# Patient Record
Sex: MALE | Race: WHITE | NOT HISPANIC OR LATINO | Employment: FULL TIME | URBAN - METROPOLITAN AREA
[De-identification: names, ages, dates, MRNs, and addresses within clinical notes are randomized per-mention and may not be internally consistent; named-entity substitution may affect disease eponyms.]

---

## 2022-04-25 ASSESSMENT — ENCOUNTER SYMPTOMS
COUGH: 0
WEAKNESS: 0
CHILLS: 0
FREQUENCY: 0
DIARRHEA: 0
JOINT SWELLING: 0
DYSURIA: 0
MYALGIAS: 0
SORE THROAT: 0
PALPITATIONS: 0
SHORTNESS OF BREATH: 0
PARESTHESIAS: 0
FEVER: 0
HEMATURIA: 0
HEMATOCHEZIA: 0
DIZZINESS: 0
EYE PAIN: 0
NAUSEA: 0
ABDOMINAL PAIN: 0
CONSTIPATION: 0
ARTHRALGIAS: 0
HEADACHES: 0
NERVOUS/ANXIOUS: 0
HEARTBURN: 0

## 2022-04-29 ENCOUNTER — OFFICE VISIT (OUTPATIENT)
Dept: FAMILY MEDICINE | Facility: CLINIC | Age: 42
End: 2022-04-29
Payer: COMMERCIAL

## 2022-04-29 VITALS
BODY MASS INDEX: 26.43 KG/M2 | HEART RATE: 76 BPM | DIASTOLIC BLOOD PRESSURE: 83 MMHG | TEMPERATURE: 97.4 F | WEIGHT: 195.13 LBS | HEIGHT: 72 IN | SYSTOLIC BLOOD PRESSURE: 130 MMHG

## 2022-04-29 DIAGNOSIS — Z30.9 ENCOUNTER FOR CONTRACEPTIVE MANAGEMENT, UNSPECIFIED TYPE: ICD-10-CM

## 2022-04-29 DIAGNOSIS — Z00.00 ROUTINE GENERAL MEDICAL EXAMINATION AT A HEALTH CARE FACILITY: Primary | ICD-10-CM

## 2022-04-29 DIAGNOSIS — Z13.220 SCREENING FOR HYPERLIPIDEMIA: ICD-10-CM

## 2022-04-29 DIAGNOSIS — L91.0 KELOID SCAR: ICD-10-CM

## 2022-04-29 DIAGNOSIS — Z13.1 SCREENING FOR DIABETES MELLITUS: ICD-10-CM

## 2022-04-29 DIAGNOSIS — Z12.5 SCREENING FOR PROSTATE CANCER: ICD-10-CM

## 2022-04-29 DIAGNOSIS — Z83.719 FAMILY HISTORY OF COLONIC POLYPS: ICD-10-CM

## 2022-04-29 LAB
CHOLEST SERPL-MCNC: 211 MG/DL
FASTING STATUS PATIENT QL REPORTED: YES
HBA1C MFR BLD: 5.1 % (ref 0–5.6)
HDLC SERPL-MCNC: 62 MG/DL
LDLC SERPL CALC-MCNC: 128 MG/DL
NONHDLC SERPL-MCNC: 149 MG/DL
PSA SERPL-MCNC: 0.98 UG/L (ref 0–4)
TRIGL SERPL-MCNC: 103 MG/DL

## 2022-04-29 PROCEDURE — 83036 HEMOGLOBIN GLYCOSYLATED A1C: CPT | Performed by: FAMILY MEDICINE

## 2022-04-29 PROCEDURE — 36415 COLL VENOUS BLD VENIPUNCTURE: CPT | Performed by: FAMILY MEDICINE

## 2022-04-29 PROCEDURE — 99386 PREV VISIT NEW AGE 40-64: CPT | Performed by: FAMILY MEDICINE

## 2022-04-29 PROCEDURE — 80061 LIPID PANEL: CPT | Performed by: FAMILY MEDICINE

## 2022-04-29 PROCEDURE — G0103 PSA SCREENING: HCPCS | Performed by: FAMILY MEDICINE

## 2022-04-29 ASSESSMENT — ENCOUNTER SYMPTOMS
MYALGIAS: 0
ABDOMINAL PAIN: 0
HEMATOCHEZIA: 0
PARESTHESIAS: 0
EYE PAIN: 0
HEMATURIA: 0
DIZZINESS: 0
NAUSEA: 0
HEADACHES: 0
SORE THROAT: 0
FEVER: 0
ARTHRALGIAS: 0
FREQUENCY: 0
DIARRHEA: 0
HEARTBURN: 0
PALPITATIONS: 0
CHILLS: 0
CONSTIPATION: 0
JOINT SWELLING: 0
COUGH: 0
DYSURIA: 0
NERVOUS/ANXIOUS: 0
WEAKNESS: 0
SHORTNESS OF BREATH: 0

## 2022-04-29 ASSESSMENT — PAIN SCALES - GENERAL: PAINLEVEL: NO PAIN (0)

## 2022-04-29 NOTE — PROGRESS NOTES
SUBJECTIVE:   CC: Jarvis Lord is an 42 year old male who presents for preventative health visit.        Patient has been advised of split billing requirements and indicates understanding: Yes  Healthy Habits:     Getting at least 3 servings of Calcium per day:  NO    Bi-annual eye exam:  NO    Dental care twice a year:  NO    Sleep apnea or symptoms of sleep apnea:  None    Diet:  Regular (no restrictions)    Frequency of exercise:  4-5 days/week    Duration of exercise:  45-60 minutes    Taking medications regularly:  Yes    Medication side effects:  None    PHQ-2 Total Score: 0    Additional concerns today:  Yes               Today's PHQ-2 Score:   PHQ-2 ( 1999 Pfizer) 4/25/2022   Q1: Little interest or pleasure in doing things 0   Q2: Feeling down, depressed or hopeless 0   PHQ-2 Score 0   Q1: Little interest or pleasure in doing things Not at all   Q2: Feeling down, depressed or hopeless Not at all   PHQ-2 Score 0       Abuse: Current or Past(Physical, Sexual or Emotional)- No  Do you feel safe in your environment? Yes        Social History     Tobacco Use     Smoking status: Never Smoker     Smokeless tobacco: Never Used   Substance Use Topics     Alcohol use: Yes     If you drink alcohol do you typically have >3 drinks per day or >7 drinks per week? Yes       No flowsheet data found.    Last PSA: No results found for: PSA    Reviewed orders with patient. Reviewed health maintenance and updated orders accordingly - Yes       Reviewed and updated as needed this visit by clinical staff   Tobacco  Allergies  Meds   Med Hx  Surg Hx  Fam Hx  Soc Hx          Reviewed and updated as needed this visit by Provider                       Review of Systems   Constitutional: Negative for chills and fever.   HENT: Negative for congestion, ear pain, hearing loss and sore throat.    Eyes: Negative for pain and visual disturbance.   Respiratory: Negative for cough and shortness of breath.    Cardiovascular: Negative  "for chest pain, palpitations and peripheral edema.   Gastrointestinal: Negative for abdominal pain, constipation, diarrhea, heartburn, hematochezia and nausea.   Genitourinary: Negative for dysuria, frequency, genital sores, hematuria, impotence, penile discharge and urgency.   Musculoskeletal: Negative for arthralgias, joint swelling and myalgias.   Skin: Negative for rash.   Neurological: Negative for dizziness, weakness, headaches and paresthesias.   Psychiatric/Behavioral: Negative for mood changes. The patient is not nervous/anxious.       no meds    Healthy    Feels fine    Seated work/ banker    Running for 4-5 years     Training for [employer]        OBJECTIVE:   /83 (BP Location: Left arm, Patient Position: Chair, Cuff Size: Adult Regular)   Pulse 76   Temp 97.4  F (36.3  C) (Temporal)   Ht 1.83 m (6' 0.05\")   Wt 88.5 kg (195 lb 2 oz)   BMI 26.43 kg/m      Physical Exam  GENERAL: healthy, alert and no distress  EYES: Eyes grossly normal to inspection, PERRL and conjunctivae and sclerae normal  HENT: ear canals and TM's normal, nose and mouth without ulcers or lesions  NECK: no adenopathy, no asymmetry, masses, or scars and thyroid normal to palpation  RESP: lungs clear to auscultation - no rales, rhonchi or wheezes  CV: regular rate and rhythm, normal S1 S2, no S3 or S4, no murmur, click or rub, no peripheral edema and peripheral pulses strong  ABDOMEN: soft, nontender, no hepatosplenomegaly, no masses and bowel sounds normal  MS: no gross musculoskeletal defects noted, no edema  SKIN: keloid type scar on chest   NEURO: Normal strength and tone, mentation intact and speech normal  PSYCH: mentation appears normal, affect normal/bright    Diagnostic Test Results:  Labs reviewed in Epic    ASSESSMENT/PLAN:   Jarvis was seen today for physical.    Diagnoses and all orders for this visit:    Routine general medical examination at a health care facility    Screening for hyperlipidemia  -     Lipid panel " reflex to direct LDL Non-fasting; Future  -     Lipid panel reflex to direct LDL Non-fasting    Family history of colonic polyps  -     Adult Gastro Ref - Procedure Only; Future    Screening for prostate cancer  -     Prostate Specific Antigen Screen; Future  -     Prostate Specific Antigen Screen    Encounter for contraceptive management, unspecified type  -     Adult Urology Referral; Future    Keloid scar  -     Adult Dermatology Referral; Future    Screening for diabetes mellitus  -     Hemoglobin A1c; Future  -     Hemoglobin A1c      ASSESSMENT / PLAN:  (Z00.00) Routine general medical examination at a health care facility  (primary encounter diagnosis)  Comment: overall patient in very good health  Plan: keep working on healthy diet/exercise     (Z13.220) Screening for hyperlipidemia  Comment: check fasting   Plan: Lipid panel reflex to direct LDL Non-fasting             (Z83.71) Family history of colonic polyps  Comment: patient 42 and strong family history of colon polyps.  Should have baseline colonoscopy.  I put in order. He will schedule but he will also check with insurance to make sure this is covered.   Plan: Adult Gastro Ref - Procedure Only             (Z12.5) Screening for prostate cancer  Comment: psa   Plan: Prostate Specific Antigen Screen             (Z30.9) Encounter for contraceptive management, unspecified type  Comment: patient interested in vasectomy  Plan: Adult Urology Referral             (L91.0) Keloid scar  Comment: patient should see dermatology.  Perhaps a steroid injection in keloid scar may cause some reduction in size of lesion.   Plan: Adult Dermatology Referral             (Z13.1) Screening for diabetes mellitus  Comment: check    Plan: Hemoglobin A1c               I reviewed the patient's medications, allergies, medical history, family history, and social history.    Fernando Winkler MD        Patient has been advised of split billing requirements and indicates understanding:  "No    COUNSELING:   Reviewed preventive health counseling, as reflected in patient instructions       Regular exercise       Healthy diet/nutrition       Vision screening       Family planning       Safe sex practices/STD prevention       Colorectal cancer screening       Prostate cancer screening    Estimated body mass index is 26.43 kg/m  as calculated from the following:    Height as of this encounter: 1.83 m (6' 0.05\").    Weight as of this encounter: 88.5 kg (195 lb 2 oz).     Weight management plan: Discussed healthy diet and exercise guidelines    He reports that he has never smoked. He has never used smokeless tobacco.      Counseling Resources:  ATP IV Guidelines  Pooled Cohorts Equation Calculator  FRAX Risk Assessment  ICSI Preventive Guidelines  Dietary Guidelines for Americans, 2010  USDA's MyPlate  ASA Prophylaxis  Lung CA Screening    Fernando Winkler MD  [facility]  "

## 2022-04-29 NOTE — PATIENT INSTRUCTIONS
Preventive Health Recommendations  Male Ages 40 to 49    Yearly exam:             See your health care provider every year in order to  o   Review health changes.   o   Discuss preventive care.    o   Review your medicines if your doctor has prescribed any.  You should be tested each year for STDs (sexually transmitted diseases) if you re at risk.   Have a cholesterol test every 5 years.   Have a colonoscopy (test for colon cancer) if someone in your family has had colon cancer or polyps before age 50.   After age 45, have a diabetes test (fasting glucose). If you are at risk for diabetes, you should have this test every 3 years.    Talk with your health care provider about whether or not a prostate cancer screening test (PSA) is right for you.    Shots: Get a flu shot each year. Get a tetanus shot every 10 years.     Nutrition:  Eat at least 5 servings of fruits and vegetables daily.   Eat whole-grain bread, whole-wheat pasta and brown rice instead of white grains and rice.   Get adequate Calcium and Vitamin D.     Lifestyle  Exercise for at least 150 minutes a week (30 minutes a day, 5 days a week). This will help you control your weight and prevent disease.   Limit alcohol to one drink per day.   No smoking.   Wear sunscreen to prevent skin cancer.   See your dentist every six months for an exam and cleaning.       Keep working on healthy diet/exercise     We will send you lab results    Schedule colonoscopy    Urology consult for vasectomy    Dermatology appointment for keloid

## 2022-05-01 NOTE — RESULT ENCOUNTER NOTE
Cholesterol is mildly elevated.  Not real worrisome.  Keep working on healthy diet/exercise.    Prostate blood test is fine.    Normal hemoglobin a1c means no diabetes.    Best wishes with the marathon!    Fernando Winkler MD

## 2022-10-09 ENCOUNTER — HEALTH MAINTENANCE LETTER (OUTPATIENT)
Age: 42
End: 2022-10-09

## 2023-04-20 ENCOUNTER — PATIENT OUTREACH (OUTPATIENT)
Dept: CARE COORDINATION | Facility: CLINIC | Age: 43
End: 2023-04-20
Payer: COMMERCIAL

## 2023-06-10 ENCOUNTER — HEALTH MAINTENANCE LETTER (OUTPATIENT)
Age: 43
End: 2023-06-10

## 2023-08-07 ASSESSMENT — ENCOUNTER SYMPTOMS
PALPITATIONS: 0
COUGH: 0
NERVOUS/ANXIOUS: 0
SORE THROAT: 0
HEARTBURN: 0
SHORTNESS OF BREATH: 0
CONSTIPATION: 0
JOINT SWELLING: 0
ARTHRALGIAS: 0
MYALGIAS: 0
WEAKNESS: 0
HEMATURIA: 0
PARESTHESIAS: 0
FEVER: 0
NAUSEA: 0
CHILLS: 0
HEADACHES: 1
ABDOMINAL PAIN: 0
FREQUENCY: 0
DIARRHEA: 0
EYE PAIN: 0
HEMATOCHEZIA: 0
DIZZINESS: 0
DYSURIA: 0

## 2023-08-08 ENCOUNTER — OFFICE VISIT (OUTPATIENT)
Dept: FAMILY MEDICINE | Facility: CLINIC | Age: 43
End: 2023-08-08
Payer: COMMERCIAL

## 2023-08-08 VITALS
BODY MASS INDEX: 26.55 KG/M2 | DIASTOLIC BLOOD PRESSURE: 87 MMHG | TEMPERATURE: 97 F | HEIGHT: 72 IN | RESPIRATION RATE: 16 BRPM | SYSTOLIC BLOOD PRESSURE: 129 MMHG | WEIGHT: 196 LBS | HEART RATE: 52 BPM | OXYGEN SATURATION: 100 %

## 2023-08-08 DIAGNOSIS — Z83.719 FAMILY HISTORY OF COLONIC POLYPS: ICD-10-CM

## 2023-08-08 DIAGNOSIS — Z00.00 ROUTINE GENERAL MEDICAL EXAMINATION AT A HEALTH CARE FACILITY: Primary | ICD-10-CM

## 2023-08-08 DIAGNOSIS — Z13.1 SCREENING FOR DIABETES MELLITUS: ICD-10-CM

## 2023-08-08 DIAGNOSIS — L98.9 SKIN LESIONS: ICD-10-CM

## 2023-08-08 DIAGNOSIS — Z30.9 ENCOUNTER FOR CONTRACEPTIVE MANAGEMENT, UNSPECIFIED TYPE: ICD-10-CM

## 2023-08-08 DIAGNOSIS — E78.5 HYPERLIPIDEMIA LDL GOAL <100: ICD-10-CM

## 2023-08-08 DIAGNOSIS — Z12.5 SCREENING FOR PROSTATE CANCER: ICD-10-CM

## 2023-08-08 LAB
ALBUMIN SERPL BCG-MCNC: 4.8 G/DL (ref 3.5–5.2)
ALP SERPL-CCNC: 86 U/L (ref 40–129)
ALT SERPL W P-5'-P-CCNC: 32 U/L (ref 0–70)
ANION GAP SERPL CALCULATED.3IONS-SCNC: 10 MMOL/L (ref 7–15)
AST SERPL W P-5'-P-CCNC: 50 U/L (ref 0–45)
BILIRUB SERPL-MCNC: 0.5 MG/DL
BUN SERPL-MCNC: 14 MG/DL (ref 6–20)
CALCIUM SERPL-MCNC: 10 MG/DL (ref 8.6–10)
CHLORIDE SERPL-SCNC: 102 MMOL/L (ref 98–107)
CHOLEST SERPL-MCNC: 273 MG/DL
CREAT SERPL-MCNC: 0.85 MG/DL (ref 0.67–1.17)
DEPRECATED HCO3 PLAS-SCNC: 27 MMOL/L (ref 22–29)
GFR SERPL CREATININE-BSD FRML MDRD: >90 ML/MIN/1.73M2
GLUCOSE SERPL-MCNC: 94 MG/DL (ref 70–99)
HBA1C MFR BLD: 5.1 % (ref 0–5.6)
HDLC SERPL-MCNC: 74 MG/DL
LDLC SERPL CALC-MCNC: 182 MG/DL
NONHDLC SERPL-MCNC: 199 MG/DL
POTASSIUM SERPL-SCNC: 5.3 MMOL/L (ref 3.4–5.3)
PROT SERPL-MCNC: 7.8 G/DL (ref 6.4–8.3)
PSA SERPL DL<=0.01 NG/ML-MCNC: 1.02 NG/ML (ref 0–2.5)
SODIUM SERPL-SCNC: 139 MMOL/L (ref 136–145)
TRIGL SERPL-MCNC: 86 MG/DL

## 2023-08-08 PROCEDURE — 99396 PREV VISIT EST AGE 40-64: CPT | Performed by: FAMILY MEDICINE

## 2023-08-08 PROCEDURE — 80061 LIPID PANEL: CPT | Performed by: FAMILY MEDICINE

## 2023-08-08 PROCEDURE — 36415 COLL VENOUS BLD VENIPUNCTURE: CPT | Performed by: FAMILY MEDICINE

## 2023-08-08 PROCEDURE — 80053 COMPREHEN METABOLIC PANEL: CPT | Performed by: FAMILY MEDICINE

## 2023-08-08 PROCEDURE — 83036 HEMOGLOBIN GLYCOSYLATED A1C: CPT | Performed by: FAMILY MEDICINE

## 2023-08-08 PROCEDURE — G0103 PSA SCREENING: HCPCS | Performed by: FAMILY MEDICINE

## 2023-08-08 ASSESSMENT — ENCOUNTER SYMPTOMS
HEMATOCHEZIA: 0
SHORTNESS OF BREATH: 0
FREQUENCY: 0
ABDOMINAL PAIN: 0
NERVOUS/ANXIOUS: 0
DIARRHEA: 0
DYSURIA: 0
PARESTHESIAS: 0
MYALGIAS: 0
FEVER: 0
SORE THROAT: 0
CHILLS: 0
JOINT SWELLING: 0
DIZZINESS: 0
PALPITATIONS: 0
WEAKNESS: 0
HEARTBURN: 0
NAUSEA: 0
HEADACHES: 1
ARTHRALGIAS: 0
EYE PAIN: 0
COUGH: 0
CONSTIPATION: 0
HEMATURIA: 0

## 2023-08-08 ASSESSMENT — PAIN SCALES - GENERAL: PAINLEVEL: NO PAIN (0)

## 2023-08-08 NOTE — PATIENT INSTRUCTIONS
We will send you lab results    Monitor head symptoms; call if you want imitrex ( sumatriptan ) sent in     Schedule colonoscopy     Schedule urology and dermatology consults                  Preventive Health Recommendations  Male Ages 40 to 49    Yearly exam:             See your health care provider every year in order to  o   Review health changes.   o   Discuss preventive care.    o   Review your medicines if your doctor has prescribed any.  You should be tested each year for STDs (sexually transmitted diseases) if you re at risk.   Have a cholesterol test every 5 years.   Have a colonoscopy (test for colon cancer) if someone in your family has had colon cancer or polyps before age 50.   After age 45, have a diabetes test (fasting glucose). If you are at risk for diabetes, you should have this test every 3 years.    Talk with your health care provider about whether or not a prostate cancer screening test (PSA) is right for you.    Shots: Get a flu shot each year. Get a tetanus shot every 10 years.     Nutrition:  Eat at least 5 servings of fruits and vegetables daily.   Eat whole-grain bread, whole-wheat pasta and brown rice instead of white grains and rice.   Get adequate Calcium and Vitamin D.     Lifestyle  Exercise for at least 150 minutes a week (30 minutes a day, 5 days a week). This will help you control your weight and prevent disease.   Limit alcohol to one drink per day.   No smoking.   Wear sunscreen to prevent skin cancer.   See your dentist every six months for an exam and cleaning.

## 2023-08-08 NOTE — PROGRESS NOTES
SUBJECTIVE:   CC: Paul is an 43 year old who presents for preventative health visit.       8/8/2023    10:01 AM   Additional Questions   Roomed by Addis Hurd       Healthy Habits:     Getting at least 3 servings of Calcium per day:  NO    Bi-annual eye exam:  NO    Dental care twice a year:  NO    Sleep apnea or symptoms of sleep apnea:  None    Diet:  Regular (no restrictions) and Breakfast skipped    Frequency of exercise:  2-3 days/week    Duration of exercise:  45-60 minutes    Taking medications regularly:  Yes    Barriers to taking medications:  None    Medication side effects:  Not applicable    Additional concerns today:  No      Today's PHQ-2 Score:       8/7/2023     7:36 PM   PHQ-2 ( 1999 Pfizer)   Q1: Little interest or pleasure in doing things 0   Q2: Feeling down, depressed or hopeless 0   PHQ-2 Score 0   Q1: Little interest or pleasure in doing things Not at all   Q2: Feeling down, depressed or hopeless Not at all   PHQ-2 Score 0                      Social History     Tobacco Use    Smoking status: Never    Smokeless tobacco: Never   Substance Use Topics    Alcohol use: Yes     Comment: moderate             8/7/2023     7:35 PM   Alcohol Use   Prescreen: >3 drinks/day or >7 drinks/week? No          No data to display                Last PSA:   Prostate Specific Antigen Screen   Date Value Ref Range Status   04/29/2022 0.98 0.00 - 4.00 ug/L Final       Reviewed orders with patient. Reviewed health maintenance and updated orders accordingly - Yes       Reviewed and updated as needed this visit by clinical staff   Tobacco  Allergies  Meds              Reviewed and updated as needed this visit by Provider                     Review of Systems   Constitutional:  Negative for chills and fever.   HENT:  Negative for congestion, ear pain, hearing loss and sore throat.    Eyes:  Negative for pain and visual disturbance.   Respiratory:  Negative for cough and shortness of breath.    Cardiovascular:   "Negative for chest pain, palpitations and peripheral edema.   Gastrointestinal:  Negative for abdominal pain, constipation, diarrhea, heartburn, hematochezia and nausea.   Genitourinary:  Negative for dysuria, frequency, genital sores, hematuria, impotence, penile discharge and urgency.   Musculoskeletal:  Negative for arthralgias, joint swelling and myalgias.   Skin:  Negative for rash.   Neurological:  Positive for headaches. Negative for dizziness, weakness and paresthesias.   Psychiatric/Behavioral:  Negative for mood changes. The patient is not nervous/anxious.      Remote history of migraines many years ago as a kid    4 x in last year, had aura or blurred vision but no headache  Had hungover feeling for a day or so    The last one, went home and rested    Otherwise feeling good    Ran marathon, 3:51 grandmas marathon    Running is main exercise    Banker, seated work    Condom for birth control    Needs colonoscopy       Dad and grandpa both prostate cancer        OBJECTIVE:   /87 (BP Location: Left arm, Patient Position: Chair, Cuff Size: Adult Regular)   Pulse 52   Temp 97  F (36.1  C) (Temporal)   Resp 16   Ht 1.83 m (6' 0.05\")   Wt 88.9 kg (196 lb)   SpO2 100%   BMI 26.55 kg/m      Physical Exam  GENERAL: healthy, alert and no distress  EYES: Eyes grossly normal to inspection, PERRL and conjunctivae and sclerae normal  HENT: ear canals and TM's normal, nose and mouth without ulcers or lesions  NECK: no adenopathy, no asymmetry, masses, or scars and thyroid normal to palpation  RESP: lungs clear to auscultation - no rales, rhonchi or wheezes  CV: regular rate and rhythm, normal S1 S2, no S3 or S4, no murmur, click or rub, no peripheral edema and peripheral pulses strong  ABDOMEN: soft, nontender, no hepatosplenomegaly, no masses and bowel sounds normal  MS: no gross musculoskeletal defects noted, no edema  SKIN: lots of chronic skin changes  NEURO: Normal strength and tone, mentation intact " "and speech normal  PSYCH: mentation appears normal, affect normal/bright        ASSESSMENT/PLAN:   Jarvis was seen today for physical.    Diagnoses and all orders for this visit:    Routine general medical examination at a health care facility    Hyperlipidemia LDL goal <100  -     Lipid panel reflex to direct LDL Non-fasting; Future  -     Comprehensive metabolic panel; Future  -     Lipid panel reflex to direct LDL Non-fasting  -     Comprehensive metabolic panel    Screening for diabetes mellitus  -     Hemoglobin A1c; Future  -     Hemoglobin A1c    Screening for prostate cancer  -     Prostate Specific Antigen Screen; Future  -     Prostate Specific Antigen Screen    Family history of colonic polyps  -     Adult GI  Referral - Procedure Only; Future    Encounter for contraceptive management, unspecified type  -     Adult Urology  Referral; Future    Skin lesions  -     Adult Dermatology Referral; Future    Patient in very good health  He can schedule colonoscopy, dermatology consult for skin check, and urology consult for vasectomy consult  Keep working on healthy diet/exercise  Advised sun protection  He may have some mild plantar fasciitis, discussed  Check labs, almost fasting   If desired he could call for prn imitrex; could also see neurology if symptoms worsen   Be seen promptly if symptoms acutely worsen       Patient has been advised of split billing requirements and indicates understanding: Yes      COUNSELING:   Reviewed preventive health counseling, as reflected in patient instructions       Regular exercise       Healthy diet/nutrition       Vision screening       Safe sex practices/STD prevention       Colorectal cancer screening       Prostate cancer screening      BMI:   Estimated body mass index is 26.55 kg/m  as calculated from the following:    Height as of this encounter: 1.83 m (6' 0.05\").    Weight as of this encounter: 88.9 kg (196 lb).         He reports that he has " never smoked. He has never used smokeless tobacco.            Fernando Winkler MD  [facility]

## 2023-08-09 NOTE — RESULT ENCOUNTER NOTE
Cholesterol is quite high this year.  This is unexpected.  I will try to call you in the next few days to discuss further.    Other labs are okay except for slight elevation of one liver test ( AST ).    Fernando Winkler MD

## 2023-08-14 ENCOUNTER — TELEPHONE (OUTPATIENT)
Dept: FAMILY MEDICINE | Facility: CLINIC | Age: 43
End: 2023-08-14
Payer: COMMERCIAL

## 2023-08-14 DIAGNOSIS — Z13.6 SCREENING FOR HEART DISEASE: Primary | ICD-10-CM

## 2023-08-16 NOTE — TELEPHONE ENCOUNTER
I called and discussed in detail cholesterol results.  Advised he consider the coronary calcium CT scan to risk stratify regarding need for statin etc.     Keep working on healthy diet/exercise also.    He feels fine with exercise.    Fernando Winkler MD

## 2023-08-22 ENCOUNTER — TELEPHONE (OUTPATIENT)
Dept: GASTROENTEROLOGY | Facility: CLINIC | Age: 43
End: 2023-08-22
Payer: COMMERCIAL

## 2023-08-22 NOTE — TELEPHONE ENCOUNTER
"Endoscopy Scheduling Screen    Have you had a positive Covid test in the last 14 days?  No    Are you active on MyChart?   Yes    What insurance is in the chart?  Other:      Ordering/Referring Provider:   SARA SPENCE        (If ordering provider performs procedure, schedule with ordering provider unless otherwise instructed. )    BMI: Estimated body mass index is 26.55 kg/m  as calculated from the following:    Height as of 8/8/23: 1.83 m (6' 0.05\").    Weight as of 8/8/23: 88.9 kg (196 lb).     Sedation Ordered  moderate sedation.   If patient BMI > 50 do not schedule in [facility].    Are you taking any prescription medications for pain?   No    Are you taking methadone or Suboxone?  No    Do you have a history of malignant hyperthermia or adverse reaction to anesthesia?  No    (Females) Are you currently pregnant?   No     Have you been diagnosed or told you have pulmonary hypertension?   No    Do you have an LVAD?  No    Have you been told you have moderate to severe sleep apnea?  No    Have you been told you have COPD, asthma, or any other lung disease?  No    Do you have any heart conditions?  No     Have you ever had or are you awaiting a heart or lung transplant?   No    Have you had a stroke or transient ischemic attack (TIA aka \"mini stroke\" in the last 6 months?   No    Have you been diagnosed with or been told you have cirrhosis of the liver?   No    Are you currently on dialysis?   No    Do you need assistance transferring?   No    BMI: Estimated body mass index is 26.55 kg/m  as calculated from the following:    Height as of 8/8/23: 1.83 m (6' 0.05\").    Weight as of 8/8/23: 88.9 kg (196 lb).     Is patients BMI > 40 and scheduling location [facility]?  No    Do you take the medication Phentermine, Ozempic or Wegovy?  No    Do you take the medication Naltrexone?  No    Do you take blood thinners?  No      Prep   Are you currently on dialysis or do you have chronic kidney disease?  No    Do you have a " diagnosis of diabetes?  No    Do you have a diagnosis of cystic fibrosis (CF)?  No    On a regular basis do you go 3 -5 days between bowel movements?  Yes (Extended Prep)    BMI > 40?  No    Preferred Pharmacy:    [id number] - [facility] [address]  [address] [facility] [address]  Phone: [phone] Fax: [phone]      Final Scheduling Details   Colonoscopy prep sent?  Golytely Extended Prep    Procedure scheduled  Colonoscopy    Surgeon:   Cassandra    Date of procedure:  11/6     Schedule PAC:   No    Location  [facility]    Sedation   Moderate Sedation    Patient Reminders:   You will receive a call from a Nurse to review instructions and health history.  This assessment must be completed prior to your procedure.  Failure to complete the Nurse assessment may result in the procedure being cancelled.      On the day of your procedure, please designate an adult(s) who can drive you home stay with you for the next 24 hours. The medicines used in the exam will make you sleepy. You will not be able to drive.      You cannot take public transportation, ride share services, or non-medical taxi service without a responsible caregiver.  Medical transport services are allowed with the requirement that a responsible caregiver will receive you at your destination.  We require that drivers and caregivers are confirmed prior to your procedure.

## 2023-08-31 ENCOUNTER — ANCILLARY PROCEDURE (OUTPATIENT)
Dept: CT IMAGING | Facility: CLINIC | Age: 43
End: 2023-08-31
Attending: FAMILY MEDICINE

## 2023-08-31 DIAGNOSIS — Z13.6 SCREENING FOR HEART DISEASE: ICD-10-CM

## 2023-08-31 PROCEDURE — 75571 CT HRT W/O DYE W/CA TEST: CPT | Mod: GC | Performed by: STUDENT IN AN ORGANIZED HEALTH CARE EDUCATION/TRAINING PROGRAM

## 2023-09-02 NOTE — RESULT ENCOUNTER NOTE
You have quite a bit of calcium in your coronary arteries.  I will try to call in the next week to discuss in more detail.    Fernando Winkler MD

## 2023-09-05 ENCOUNTER — TELEPHONE (OUTPATIENT)
Dept: FAMILY MEDICINE | Facility: CLINIC | Age: 43
End: 2023-09-05
Payer: COMMERCIAL

## 2023-09-05 DIAGNOSIS — E78.5 HYPERLIPIDEMIA LDL GOAL <100: Primary | ICD-10-CM

## 2023-09-05 RX ORDER — ATORVASTATIN CALCIUM 10 MG/1
10 TABLET, FILM COATED ORAL DAILY
Qty: 90 TABLET | Refills: 0 | Status: SHIPPED | OUTPATIENT
Start: 2023-09-05 | End: 2023-11-28

## 2023-09-06 NOTE — TELEPHONE ENCOUNTER
I called and discussed in detail   Patient has quite high score on the coronary calcium CT   Prudent to start statin  Sent in prescription  Do labs in 1-2 months  Work on diet etc  Also could consider 81 mg asa, perhaps at least every other day   Patient agreed with plan  Fernando Winkler MD

## 2023-09-21 ENCOUNTER — VIRTUAL VISIT (OUTPATIENT)
Dept: UROLOGY | Facility: CLINIC | Age: 43
End: 2023-09-21
Attending: UROLOGY
Payer: COMMERCIAL

## 2023-09-21 DIAGNOSIS — Z30.9 ENCOUNTER FOR CONTRACEPTIVE MANAGEMENT, UNSPECIFIED TYPE: ICD-10-CM

## 2023-09-21 PROCEDURE — 99203 OFFICE O/P NEW LOW 30 MIN: CPT | Mod: VID | Performed by: UROLOGY

## 2023-09-21 NOTE — PROGRESS NOTES
Paul is a 43 year old who is being evaluated via a billable video visit.      How would you like to obtain your AVS? MyChart  If the video visit is dropped, the invitation should be resent by: Text to cell phone: [phone]  Will anyone else be joining your video visit? No              Subjective   Paul is a 43 year old, presenting for the following health issues:  Video Visit    HPI     Patient is a 43 y.o. male who was requested by Dr. Winkler for vasectomy consult.  He has 3 kids.      Review of Systems   Constitutional, HEENT, cardiovascular, pulmonary, gi and gu systems are negative, except as otherwise noted.      Objective           Vitals:  No vitals were obtained today due to virtual visit.    Physical Exam   GENERAL: Healthy, alert and no distress  EYES: Eyes grossly normal to inspection.  No discharge or erythema, or obvious scleral/conjunctival abnormalities.  RESP: No audible wheeze, cough, or visible cyanosis.  No visible retractions or increased work of breathing.    SKIN: Visible skin clear. No significant rash, abnormal pigmentation or lesions.  NEURO: Cranial nerves grossly intact.  Mentation and speech appropriate for age.  PSYCH: Mentation appears normal, affect normal/bright, judgement and insight intact, normal speech and appearance well-groomed.        Discussed    That vasectomy is permanent method of birth control.    That vasectomy can fail due to recanalization of the vas even many months/years later.    That he needs 2 negative sperm checks to be considered sterile    That there are other methods that are not permanent and also that the sperm can be frozen for later use.    How the technique is performed, risks of infection, bleeding, damage to the testes vessels and testes atrophy    Long term complication such as chronic and difficult to treat testes pain and questionable increase incidence of prostate cancer    That the procedure can be done at the clinic or hospital  OR        Plan:    Stop Aspirin  Will schedule Vasectomy in the future        Video-Visit Details    Type of service:  Video Visit     Originating Location (pt. Location): Home    Distant Location (provider location):  On-site  Platform used for Video Visit: Pipo

## 2023-09-21 NOTE — PATIENT INSTRUCTIONS
[facility]  [address] [facility]  [place] [address]     Your Vasectomy is scheduled 11/17/23 arrive 8:00am.  Please call [phone] if you need to reschedule this appointment or if you have any questions.     Preparation for Vasectomy:  Eat and drink normally prior to appointment.   Shave the hair away from the base of your penis and around your testicles.  Wear snug underwear the day of the vasectomy to support your testicles.  Do not take aspirin, ibuprofen, advil, motrin, aleve products one week prior to your vasectomy.        General Vasectomy Information    Vasectomy is a surgery.  If it is successful, it will be impossible for you to ever father children.  The following information regards the male sterilization done by an operation called a vasectomy.  This is done in the physician's office.    The operation done to sterilize the male is easier, safer and much less expensive than the operation done to sterilize the woman.    Sterilization should be considered permanent.  For most males, once the operation is done, it can never me undone.  There are attempts made occasionally to reconnect the tubes that have been cut during the procedure, but this is very difficult and expensive and works only about 50-70% of the time.  In order for any of the physicians in our clinic to do a vasectomy, we require that you consider this a permanent form a sterilization.    A vasectomy can be done at any time, but it is best to think about having it done when you can take at least one day off from work and any excess activities.    Your decision to have a vasectomy should only be made with the following facts clear in mind.    1. First, a vasectomy is only one of several means of birth control.  Many form of temporary contraception are available.  If you have any questions about other methods, please discuss this with your physician.    2. A vasectomy may be unsuccessful in approximately one out of 1000 couples per  year.  This occurs when the tubes which are cut during the procedure reconnect themselves.  Sterility cannot be guaranteed.    3. You should be aware that it is the current belief of the medical profession that a vasectomy procedure does not alter a male physically, physiologically or sexually.  Because each person is a unique individual, there is always the possibility of an adverse phychiatric reaction.  This can be best avoided by being very comfortable in your own mind that you want to have this done, and that you do not want to father any children in the future.  If this is not clear in your mind, this should be further discussed with your physician.    4. You will not notice a change in the volume of your ejaculate since sperm is a very small amount of the semen and it is only the sperm that is stopped from entering the ejaculate after a vasectomy.  Your prostate and seminal vesicle glands really supply most of the semen and this is not at all decreased after a vasectomy.  Also there is no effect on the male hormones.    5. You do not become sterile immediately following a vasectomy due to the fact that there is still sperm remaining in your system that must be eliminated by ejaculation.  For this reason, your sexual partner could still become pregnant for a period of time following the vasectomy operation.  It is necessary that contraceptive measures be used until you receive confirmation from your physician that you are sterile.  It takes approximately 12 ejaculations to clear the semen of sperm, but this can differ in different men.  For this reason, it is very important that your semen be checked for sperm before you are considered sterile, and this should be done approximately 12 weeks after your vasectomy.      6. Vasectomy has risks and benefits.  Among the risks are possible complications resulting from the procedure.  These risks include but are not limited to:   A.  Bleeding, infection, or hematoma  occuring during or in the recovery period   from the procedure.   B.  Sperm granuloma or a small pea to walnut sized lump which is a collection of   scar tissue and sperm in your scrotal sack and remains permanently   C.  There may be an increased risk of prostate cancer although the data is   unclear.

## 2023-10-23 ENCOUNTER — TELEPHONE (OUTPATIENT)
Dept: GASTROENTEROLOGY | Facility: CLINIC | Age: 43
End: 2023-10-23
Payer: COMMERCIAL

## 2023-10-23 DIAGNOSIS — Z80.0 FAMILY HISTORY OF COLON CANCER: Primary | ICD-10-CM

## 2023-10-23 RX ORDER — BISACODYL 5 MG/1
TABLET, DELAYED RELEASE ORAL
Qty: 4 TABLET | Refills: 0 | Status: SHIPPED | OUTPATIENT
Start: 2023-10-23 | End: 2023-11-17

## 2023-10-23 NOTE — TELEPHONE ENCOUNTER
Attempted to contact patient in order to complete pre assessment questions.     No answer. Left message to return call to [phone] option 4      Procedure details:    Patient scheduled for Colonoscopy  on 11/6/23.     Arrival time: 1245. Procedure time 1330    Pre op exam needed? N/A    Facility location: [facility]; [address], [place] [address]    Sedation type: Conscious sedation     Indication for procedure: family hx of colon cancer      Chart review:     Electronic implanted devices? No    Diabetic? No      Medication review:    Anticoagulants? No    NSAIDS? No    Other medication HOLDING recommendations:  N/A      Prep for procedure:     Bowel prep recommendation: Extended Golytely   Due to: constipation noted or reported.     Prep instructions sent via [employer]. Bowel prep script sent to [id number] - [place], [place] - [address] [facility] AT SEC OF [address] Soler RN  Endoscopy Procedure Pre Assessment RN

## 2023-10-24 NOTE — TELEPHONE ENCOUNTER
Pre assessment completed for upcoming procedure.   (Please see previous telephone encounter notes for complete details)    Patient  returned call.       Procedure details:    Arrival time and facility location reviewed.    Pre op exam needed? N/A    Designated  policy reviewed. Instructed to have someone stay 6 hours post procedure.     COVID policy reviewed.      Medication review:    Medications reviewed. Please see supporting documentation below. Holding recommendations discussed (if applicable).       Prep for procedure:     Procedure prep instructions reviewed.        Additional information needed?  N/A      Patient  verbalized understanding and had no questions or concerns at this time.      Lu Jose RN  Endoscopy Procedure Pre Assessment RN  [phone] option 4

## 2023-11-06 ENCOUNTER — HOSPITAL ENCOUNTER (OUTPATIENT)
Facility: AMBULATORY SURGERY CENTER | Age: 43
Discharge: HOME | End: 2023-11-06
Attending: INTERNAL MEDICINE | Admitting: INTERNAL MEDICINE
Payer: COMMERCIAL

## 2023-11-06 VITALS
SYSTOLIC BLOOD PRESSURE: 116 MMHG | OXYGEN SATURATION: 99 % | TEMPERATURE: 97.4 F | DIASTOLIC BLOOD PRESSURE: 68 MMHG | HEART RATE: 67 BPM | RESPIRATION RATE: 16 BRPM

## 2023-11-06 LAB — COLONOSCOPY: NORMAL

## 2023-11-06 PROCEDURE — G8907 PT DOC NO EVENTS ON DISCHARG: HCPCS

## 2023-11-06 PROCEDURE — G8918 PT W/O PREOP ORDER IV AB PRO: HCPCS

## 2023-11-06 PROCEDURE — 45378 DIAGNOSTIC COLONOSCOPY: CPT

## 2023-11-06 RX ORDER — LIDOCAINE 40 MG/G
CREAM TOPICAL
Status: DISCONTINUED | OUTPATIENT
Start: 2023-11-06 | End: 2023-11-07 | Stop reason: HOSPADM

## 2023-11-06 RX ORDER — PROCHLORPERAZINE MALEATE 10 MG
10 TABLET ORAL EVERY 6 HOURS PRN
Status: DISCONTINUED | OUTPATIENT
Start: 2023-11-06 | End: 2023-11-07 | Stop reason: HOSPADM

## 2023-11-06 RX ORDER — ONDANSETRON 2 MG/ML
4 INJECTION INTRAMUSCULAR; INTRAVENOUS
Status: DISCONTINUED | OUTPATIENT
Start: 2023-11-06 | End: 2023-11-07 | Stop reason: HOSPADM

## 2023-11-06 RX ORDER — NALOXONE HYDROCHLORIDE 0.4 MG/ML
0.2 INJECTION, SOLUTION INTRAMUSCULAR; INTRAVENOUS; SUBCUTANEOUS
Status: DISCONTINUED | OUTPATIENT
Start: 2023-11-06 | End: 2023-11-07 | Stop reason: HOSPADM

## 2023-11-06 RX ORDER — ONDANSETRON 4 MG/1
4 TABLET, ORALLY DISINTEGRATING ORAL EVERY 6 HOURS PRN
Status: DISCONTINUED | OUTPATIENT
Start: 2023-11-06 | End: 2023-11-07 | Stop reason: HOSPADM

## 2023-11-06 RX ORDER — ONDANSETRON 2 MG/ML
4 INJECTION INTRAMUSCULAR; INTRAVENOUS EVERY 6 HOURS PRN
Status: DISCONTINUED | OUTPATIENT
Start: 2023-11-06 | End: 2023-11-07 | Stop reason: HOSPADM

## 2023-11-06 RX ORDER — FENTANYL CITRATE 50 UG/ML
INJECTION, SOLUTION INTRAMUSCULAR; INTRAVENOUS PRN
Status: DISCONTINUED | OUTPATIENT
Start: 2023-11-06 | End: 2023-11-06 | Stop reason: HOSPADM

## 2023-11-06 RX ORDER — NALOXONE HYDROCHLORIDE 0.4 MG/ML
0.4 INJECTION, SOLUTION INTRAMUSCULAR; INTRAVENOUS; SUBCUTANEOUS
Status: DISCONTINUED | OUTPATIENT
Start: 2023-11-06 | End: 2023-11-07 | Stop reason: HOSPADM

## 2023-11-06 RX ORDER — FLUMAZENIL 0.1 MG/ML
0.2 INJECTION, SOLUTION INTRAVENOUS
Status: DISCONTINUED | OUTPATIENT
Start: 2023-11-06 | End: 2023-11-07 | Stop reason: HOSPADM

## 2023-11-06 NOTE — H&P
[facility] Anesthesia Pre-op History and Physical    Jarvis Lord MRN# [id number]   Age: 43 year old YOB: 1980            Date of Exam 11/6/2023       Primary care provider: Fernando Winkler         Chief Complaint and/or Reason for Procedure:     CRC screening - sisters and father with polyps         Active problem list:     There are no problems to display for this patient.           Medications (include herbals and vitamins):   Any Plavix use in the last 7 days? No     Current Outpatient Medications   Medication Sig    atorvastatin (LIPITOR) 10 MG tablet Take 1 tablet (10 mg) by mouth daily    bisacodyl (DULCOLAX) 5 MG EC tablet Two days prior to exam take two (2) tablets at 4pm. One day prior to exam take two (2) tablets at 4pm    polyethylene glycol (GOLYTELY) 236 g suspension Take as directed. Two days before your exam fill the first container with water. Cover and shake until mixed well. At 5:00pm drink one 8oz glass every 10-15 minutes until half (1/2) of the first container is empty. Store the remainder in the refrigerator. One day before your exam at 5:00pm drink the second half of the first container until it is gone. Before you go to bed mix the second container with water and put in refrigerator. Six hours before your check in time drink one 8oz glass every 10-15 minutes until half of container is empty. Discard the remainder of solution.     Current Facility-Administered Medications   Medication    lidocaine (LMX4) kit    lidocaine 1 % 0.1-1 mL    ondansetron (ZOFRAN) injection 4 mg    sodium chloride (PF) 0.9% PF flush 3 mL    sodium chloride (PF) 0.9% PF flush 3 mL             Allergies:    No Known Allergies  Allergy to Latex? No  Allergy to tape?   No  Intolerances:             Physical Exam:   All vitals have been reviewed  Patient Vitals for the past 8 hrs:   BP Temp Temp src Resp SpO2   11/06/23 1320 138/89 97.4  F (36.3  C) Temporal 16 100 %     No intake/output data  recorded.  Lungs:   No increased work of breathing, good air exchange, clear to auscultation bilaterally, no crackles or wheezing     Cardiovascular:   normal S1 and S2             Lab / Radiology Results:            Anesthetic risk and/or ASA classification:     1  Addis Trujillo DO

## 2023-11-17 ENCOUNTER — OFFICE VISIT (OUTPATIENT)
Dept: UROLOGY | Facility: CLINIC | Age: 43
End: 2023-11-17
Payer: COMMERCIAL

## 2023-11-17 VITALS — OXYGEN SATURATION: 100 % | DIASTOLIC BLOOD PRESSURE: 89 MMHG | HEART RATE: 83 BPM | SYSTOLIC BLOOD PRESSURE: 122 MMHG

## 2023-11-17 DIAGNOSIS — Z30.2 ENCOUNTER FOR STERILIZATION: Primary | ICD-10-CM

## 2023-11-17 PROCEDURE — 88302 TISSUE EXAM BY PATHOLOGIST: CPT | Performed by: PATHOLOGY

## 2023-11-17 PROCEDURE — 55250 REMOVAL OF SPERM DUCT(S): CPT | Performed by: UROLOGY

## 2023-11-17 PROCEDURE — 99000 SPECIMEN HANDLING OFFICE-LAB: CPT | Performed by: UROLOGY

## 2023-11-17 NOTE — PROGRESS NOTES
Post Vasectomy education documentation:    To improve patient experience and health outcomes, patient was educated Post vasectomy .    Teaching method:  Patient received written materials handout and general discussion/verbal explanation. Patient was provided with post vasectomy instruction sheet, post vasectomy semen collection sheet and x2 labeled sterile specimen cups.     All questions answered. Patient acknowledged understanding on the education.    Aaliyah JARA RN Urology 11/17/2023 8:45 AM

## 2023-11-21 LAB
PATH REPORT.COMMENTS IMP SPEC: NORMAL
PATH REPORT.COMMENTS IMP SPEC: NORMAL
PATH REPORT.FINAL DX SPEC: NORMAL
PATH REPORT.GROSS SPEC: NORMAL
PATH REPORT.MICROSCOPIC SPEC OTHER STN: NORMAL
PATH REPORT.RELEVANT HX SPEC: NORMAL
PHOTO IMAGE: NORMAL

## 2023-11-28 ENCOUNTER — MYC REFILL (OUTPATIENT)
Dept: FAMILY MEDICINE | Facility: CLINIC | Age: 43
End: 2023-11-28
Payer: COMMERCIAL

## 2023-11-28 DIAGNOSIS — E78.5 HYPERLIPIDEMIA LDL GOAL <100: ICD-10-CM

## 2023-11-29 RX ORDER — ATORVASTATIN CALCIUM 10 MG/1
10 TABLET, FILM COATED ORAL DAILY
Qty: 90 TABLET | Refills: 0 | Status: SHIPPED | OUTPATIENT
Start: 2023-11-29 | End: 2023-12-15

## 2023-12-14 ENCOUNTER — LAB (OUTPATIENT)
Dept: LAB | Facility: CLINIC | Age: 43
End: 2023-12-14
Payer: COMMERCIAL

## 2023-12-14 DIAGNOSIS — Z30.2 ENCOUNTER FOR STERILIZATION: ICD-10-CM

## 2023-12-14 DIAGNOSIS — E78.5 HYPERLIPIDEMIA LDL GOAL <100: ICD-10-CM

## 2023-12-14 LAB
ALT SERPL W P-5'-P-CCNC: 37 U/L (ref 0–70)
CHOLEST SERPL-MCNC: 181 MG/DL
CK SERPL-CCNC: 70 U/L (ref 39–308)
FASTING STATUS PATIENT QL REPORTED: YES
HDLC SERPL-MCNC: 54 MG/DL
LDLC SERPL CALC-MCNC: 103 MG/DL
NONHDLC SERPL-MCNC: 127 MG/DL
TRIGL SERPL-MCNC: 120 MG/DL

## 2023-12-14 PROCEDURE — 82550 ASSAY OF CK (CPK): CPT

## 2023-12-14 PROCEDURE — 80061 LIPID PANEL: CPT

## 2023-12-14 PROCEDURE — 36415 COLL VENOUS BLD VENIPUNCTURE: CPT

## 2023-12-14 PROCEDURE — 84460 ALANINE AMINO (ALT) (SGPT): CPT

## 2023-12-15 DIAGNOSIS — E78.5 HYPERLIPIDEMIA LDL GOAL <100: ICD-10-CM

## 2023-12-15 RX ORDER — ATORVASTATIN CALCIUM 10 MG/1
10 TABLET, FILM COATED ORAL DAILY
Qty: 90 TABLET | Refills: 2 | Status: SHIPPED | OUTPATIENT
Start: 2023-12-15 | End: 2024-08-27

## 2023-12-16 NOTE — RESULT ENCOUNTER NOTE
Cholesterol is much better.    Liver and muscle enzyme tests are normal.    Stay on same medication.  I sent in refills so when you need more, just let your pharmacy know.    Fernando Winkler MD

## 2024-03-26 LAB
SEMEN ANALYSIS P VAS PNL: NORMAL
SPERM MOTILE SMN QL MICRO: NORMAL

## 2024-03-26 PROCEDURE — 89321 SEMEN ANAL SPERM DETECTION: CPT

## 2024-04-09 ENCOUNTER — LAB (OUTPATIENT)
Dept: LAB | Facility: CLINIC | Age: 44
End: 2024-04-09
Payer: COMMERCIAL

## 2024-04-09 DIAGNOSIS — Z30.2 ENCOUNTER FOR STERILIZATION: ICD-10-CM

## 2024-04-09 LAB
SEMEN ANALYSIS P VAS PNL: NORMAL
SPERM MOTILE SMN QL MICRO: NORMAL

## 2024-04-09 PROCEDURE — 89321 SEMEN ANAL SPERM DETECTION: CPT

## 2024-07-09 ENCOUNTER — PATIENT OUTREACH (OUTPATIENT)
Dept: CARE COORDINATION | Facility: CLINIC | Age: 44
End: 2024-07-09
Payer: COMMERCIAL

## 2024-07-23 ENCOUNTER — PATIENT OUTREACH (OUTPATIENT)
Dept: CARE COORDINATION | Facility: CLINIC | Age: 44
End: 2024-07-23
Payer: COMMERCIAL

## 2024-08-27 ENCOUNTER — MYC REFILL (OUTPATIENT)
Dept: FAMILY MEDICINE | Facility: CLINIC | Age: 44
End: 2024-08-27
Payer: COMMERCIAL

## 2024-08-27 DIAGNOSIS — E78.5 HYPERLIPIDEMIA LDL GOAL <100: ICD-10-CM

## 2024-08-28 RX ORDER — ATORVASTATIN CALCIUM 10 MG/1
10 TABLET, FILM COATED ORAL DAILY
Qty: 90 TABLET | Refills: 0 | Status: SHIPPED | OUTPATIENT
Start: 2024-08-28

## 2024-10-12 ENCOUNTER — HEALTH MAINTENANCE LETTER (OUTPATIENT)
Age: 44
End: 2024-10-12

## 2024-12-10 ENCOUNTER — MYC REFILL (OUTPATIENT)
Dept: FAMILY MEDICINE | Facility: CLINIC | Age: 44
End: 2024-12-10
Payer: COMMERCIAL

## 2024-12-10 DIAGNOSIS — E78.5 HYPERLIPIDEMIA LDL GOAL <100: ICD-10-CM

## 2024-12-10 RX ORDER — ATORVASTATIN CALCIUM 10 MG/1
10 TABLET, FILM COATED ORAL DAILY
Qty: 90 TABLET | Refills: 0 | Status: SHIPPED | OUTPATIENT
Start: 2024-12-10

## 2024-12-11 RX ORDER — ATORVASTATIN CALCIUM 10 MG/1
10 TABLET, FILM COATED ORAL DAILY
Qty: 90 TABLET | Refills: 0 | OUTPATIENT
Start: 2024-12-11

## 2025-02-04 ENCOUNTER — PATIENT OUTREACH (OUTPATIENT)
Dept: CARE COORDINATION | Facility: CLINIC | Age: 45
End: 2025-02-04
Payer: COMMERCIAL

## 2025-03-10 ENCOUNTER — OFFICE VISIT (OUTPATIENT)
Dept: FAMILY MEDICINE | Facility: CLINIC | Age: 45
End: 2025-03-10
Payer: COMMERCIAL

## 2025-03-10 VITALS
BODY MASS INDEX: 27.26 KG/M2 | OXYGEN SATURATION: 100 % | RESPIRATION RATE: 16 BRPM | TEMPERATURE: 97.2 F | DIASTOLIC BLOOD PRESSURE: 86 MMHG | HEIGHT: 72 IN | HEART RATE: 59 BPM | SYSTOLIC BLOOD PRESSURE: 129 MMHG | WEIGHT: 201.25 LBS

## 2025-03-10 DIAGNOSIS — E78.5 HYPERLIPIDEMIA LDL GOAL <100: ICD-10-CM

## 2025-03-10 DIAGNOSIS — L98.9 SKIN LESIONS: ICD-10-CM

## 2025-03-10 DIAGNOSIS — Z00.00 ROUTINE GENERAL MEDICAL EXAMINATION AT A HEALTH CARE FACILITY: Primary | ICD-10-CM

## 2025-03-10 DIAGNOSIS — M76.60 ACHILLES TENDINITIS, UNSPECIFIED LATERALITY: ICD-10-CM

## 2025-03-10 DIAGNOSIS — S86.819A STRAIN OF CALF MUSCLE, UNSPECIFIED LATERALITY, INITIAL ENCOUNTER: ICD-10-CM

## 2025-03-10 DIAGNOSIS — Z13.1 SCREENING FOR DIABETES MELLITUS: ICD-10-CM

## 2025-03-10 LAB
EST. AVERAGE GLUCOSE BLD GHB EST-MCNC: 100 MG/DL
HBA1C MFR BLD: 5.1 % (ref 0–5.6)

## 2025-03-10 PROCEDURE — 80061 LIPID PANEL: CPT | Performed by: FAMILY MEDICINE

## 2025-03-10 PROCEDURE — 83036 HEMOGLOBIN GLYCOSYLATED A1C: CPT | Performed by: FAMILY MEDICINE

## 2025-03-10 PROCEDURE — 1126F AMNT PAIN NOTED NONE PRSNT: CPT | Performed by: FAMILY MEDICINE

## 2025-03-10 PROCEDURE — 36415 COLL VENOUS BLD VENIPUNCTURE: CPT | Performed by: FAMILY MEDICINE

## 2025-03-10 PROCEDURE — 82550 ASSAY OF CK (CPK): CPT | Performed by: FAMILY MEDICINE

## 2025-03-10 PROCEDURE — 3079F DIAST BP 80-89 MM HG: CPT | Performed by: FAMILY MEDICINE

## 2025-03-10 PROCEDURE — 3074F SYST BP LT 130 MM HG: CPT | Performed by: FAMILY MEDICINE

## 2025-03-10 PROCEDURE — 99396 PREV VISIT EST AGE 40-64: CPT | Performed by: FAMILY MEDICINE

## 2025-03-10 PROCEDURE — 80053 COMPREHEN METABOLIC PANEL: CPT | Performed by: FAMILY MEDICINE

## 2025-03-10 RX ORDER — ATORVASTATIN CALCIUM 10 MG/1
10 TABLET, FILM COATED ORAL DAILY
Qty: 90 TABLET | Refills: 3 | Status: SHIPPED | OUTPATIENT
Start: 2025-03-10

## 2025-03-10 SDOH — HEALTH STABILITY: PHYSICAL HEALTH: ON AVERAGE, HOW MANY MINUTES DO YOU ENGAGE IN EXERCISE AT THIS LEVEL?: 50 MIN

## 2025-03-10 SDOH — HEALTH STABILITY: PHYSICAL HEALTH: ON AVERAGE, HOW MANY DAYS PER WEEK DO YOU ENGAGE IN MODERATE TO STRENUOUS EXERCISE (LIKE A BRISK WALK)?: 4 DAYS

## 2025-03-10 ASSESSMENT — SOCIAL DETERMINANTS OF HEALTH (SDOH): HOW OFTEN DO YOU GET TOGETHER WITH FRIENDS OR RELATIVES?: THREE TIMES A WEEK

## 2025-03-10 ASSESSMENT — PAIN SCALES - GENERAL: PAINLEVEL_OUTOF10: NO PAIN (0)

## 2025-03-10 NOTE — PROGRESS NOTES
Preventive Care Visit  [facility]  Fernando Winkler MD, Family Medicine  Mar 10, 2025      Assessment & Plan     (Z00.00) Routine general medical examination at a health care facility  (primary encounter diagnosis)  Comment: overall very good health  Plan: keep working on healthy diet/exercise     (E78.5) Hyperlipidemia LDL goal <100  Comment: refill med, recheck labs   Plan: Lipid panel reflex to direct LDL Non-fasting,         atorvastatin (LIPITOR) 10 MG tablet,         Comprehensive metabolic panel, CK total             (M76.60) Achilles tendinitis, unspecified laterality  Comment: discussed in detail.  Demonstrated stretching.  Use pain as guide to activity.  Plan: Orthopedic  Referral        Could see podiatry if needed     (S86.778Z) Strain of calf muscle, unspecified laterality, initial encounter  Comment: as above   Plan: Orthopedic  Referral             (Z13.1) Screening for diabetes mellitus  Comment: check   Plan: Hemoglobin A1c             (L98.9) Skin lesions  Comment: patient to see dermatology   Plan: Adult Dermatology  Referral                     BMI  Estimated body mass index is 27.29 kg/m  as calculated from the following:    Height as of this encounter: 1.829 m (6').    Weight as of this encounter: 91.3 kg (201 lb 4 oz).   Weight management plan: Discussed healthy diet and exercise guidelines    Counseling  Appropriate preventive services were addressed with this patient via screening, questionnaire, or discussion as appropriate for fall prevention, nutrition, physical activity, Tobacco-use cessation, social engagement, weight loss and cognition.  Checklist reviewing preventive services available has been given to the patient.  Reviewed patient's diet, addressing concerns and/or questions.   The patient was instructed to see the dentist every 6 months.   He is at risk for psychosocial distress and has been provided with information to reduce  risk.            Subjective   Paul is a 44 year old, presenting for the following:  Physical        3/10/2025     1:33 PM   Additional Questions   Roomed by Addis Hurd          HPI      Advance Care Planning  Patient does not have a Health Care Directive: Discussed advance care planning with patient; however, patient declined at this time.      3/10/2025   General Health   How would you rate your overall physical health? Good   Feel stress (tense, anxious, or unable to sleep) To some extent   (!) STRESS CONCERN      3/10/2025   Nutrition   Three or more servings of calcium each day? (!) I DON'T KNOW   Diet: Regular (no restrictions)   How many servings of fruit and vegetables per day? (!) 0-1   How many sweetened beverages each day? (!) 3         3/10/2025   Exercise   Days per week of moderate/strenous exercise 4 days   Average minutes spent exercising at this level 50 min         3/10/2025   Social Factors   Frequency of gathering with friends or relatives Three times a week   Worry food won't last until get money to buy more No   Food not last or not have enough money for food? No   Do you have housing? (Housing is defined as stable permanent housing and does not include staying ouside in a car, in a tent, in an abandoned building, in an overnight shelter, or couch-surfing.) Yes   Are you worried about losing your housing? No   Lack of transportation? No   Unable to get utilities (heat,electricity)? No         3/10/2025   Dental   Dentist two times every year? (!) NO           Today's PHQ-2 Score:       3/10/2025     8:33 AM   PHQ-2 ( 1999 [employer])   Q1: Little interest or pleasure in doing things 0   Q2: Feeling down, depressed or hopeless 0   PHQ-2 Score 0    Q1: Little interest or pleasure in doing things Not at all   Q2: Feeling down, depressed or hopeless Not at all   PHQ-2 Score 0       Patient-reported           3/10/2025   Substance Use   Alcohol more than 3/day or more than 7/wk No   Do you use any  other substances recreationally? No     Social History     Tobacco Use    Smoking status: Never     Passive exposure: Never    Smokeless tobacco: Never   Vaping Use    Vaping status: Never Used   Substance Use Topics    Alcohol use: Yes     Comment: moderate    Drug use: Never             3/10/2025   One time HIV Screening   Previous HIV test? No         3/10/2025   STI Screening   New sexual partner(s) since last STI/HIV test? No   ASCVD Risk   The 10-year ASCVD risk score (Carlos HAAS, et al., 2019) is: 1.7%    Values used to calculate the score:      Age: 44 years      Sex: Male      Is Non- : No      Diabetic: No      Tobacco smoker: No      Systolic Blood Pressure: 133 mmHg      Is BP treated: No      HDL Cholesterol: 54 mg/dL      Total Cholesterol: 181 mg/dL       Reviewed and updated as needed this visit by Provider                   Had colonoscopy 2023, to come back in 5 years    Feeling well    Recent calf injury    Wt going up quite a bit    Extreme lent thing, 90 days no etoh, no tv , no snacks, no social media  Cold showers    Board games    Runs 4 times per week  No races last year    Playing basketball    Hurt it about 1 1/2 weeks ago     Kids healthy    Mostly seated work         Objective    Exam  /90 (BP Location: Left arm, Patient Position: Chair, Cuff Size: Adult Regular)   Pulse 59   Temp 97.2  F (36.2  C) (Temporal)   Resp 16   Ht 1.829 m (6')   Wt 91.3 kg (201 lb 4 oz)   SpO2 100%   BMI 27.29 kg/m     Estimated body mass index is 27.29 kg/m  as calculated from the following:    Height as of this encounter: 1.829 m (6').    Weight as of this encounter: 91.3 kg (201 lb 4 oz).    Physical Exam  GENERAL: alert and no distress  EYES: Eyes grossly normal to inspection, PERRL and conjunctivae and sclerae normal  HENT: ear canals and TM's normal, nose and mouth without ulcers or lesions  NECK: no adenopathy, no asymmetry, masses, or scars  RESP: lungs clear  to auscultation - no rales, rhonchi or wheezes  CV: regular rate and rhythm, normal S1 S2, no S3 or S4, no murmur, click or rub, no peripheral edema  ABDOMEN: soft, nontender, no hepatosplenomegaly, no masses and bowel sounds normal  MS: no gross musculoskeletal defects noted, no edema  SKIN: no suspicious lesions or rashes but multiple scattered moles   NEURO: Normal strength and tone, mentation intact and speech normal  PSYCH: mentation appears normal, affect normal/bright    No point tenderness on calves bilat    No point tenderness on achilles bilat but a bit tender posterior calcaneus left foot         Signed Electronically by: Fernando Winkler MD

## 2025-03-10 NOTE — PATIENT INSTRUCTIONS
Gradually increase stretching of achilles, using pain as guide to activity    If needed could see podiatry    Schedule dermatology consult for skin check     We will send you lab results    Stay on atorvastatin                 Patient Education   Preventive Care Advice   This is general advice given by our system to help you stay healthy. However, your care team may have specific advice just for you. Please talk to your care team about your preventive care needs.  Nutrition  Eat 5 or more servings of fruits and vegetables each day.  Try wheat bread, brown rice and whole grain pasta (instead of white bread, rice, and pasta).  Get enough calcium and vitamin D. Check the label on foods and aim for 100% of the RDA (recommended daily allowance).  Lifestyle  Exercise at least 150 minutes each week  (30 minutes a day, 5 days a week).  Do muscle strengthening activities 2 days a week. These help control your weight and prevent disease.  No smoking.  Wear sunscreen to prevent skin cancer.  Have a dental exam and cleaning every 6 months.  Yearly exams  See your health care team every year to talk about:  Any changes in your health.  Any medicines your care team has prescribed.  Preventive care, family planning, and ways to prevent chronic diseases.  Shots (vaccines)   HPV shots (up to age 26), if you've never had them before.  Hepatitis B shots (up to age 59), if you've never had them before.  COVID-19 shot: Get this shot when it's due.  Flu shot: Get a flu shot every year.  Tetanus shot: Get a tetanus shot every 10 years.  Pneumococcal, hepatitis A, and RSV shots: Ask your care team if you need these based on your risk.  Shingles shot (for age 50 and up)  General health tests  Diabetes screening:  Starting at age 35, Get screened for diabetes at least every 3 years.  If you are younger than age 35, ask your care team if you should be screened for diabetes.  Cholesterol test: At age 39, start having a cholesterol test every  5 years, or more often if advised.  Bone density scan (DEXA): At age 50, ask your care team if you should have this scan for osteoporosis (brittle bones).  Hepatitis C: Get tested at least once in your life.  STIs (sexually transmitted infections)  Before age 24: Ask your care team if you should be screened for STIs.  After age 24: Get screened for STIs if you're at risk. You are at risk for STIs (including HIV) if:  You are sexually active with more than one person.  You don't use condoms every time.  You or a partner was diagnosed with a sexually transmitted infection.  If you are at risk for HIV, ask about PrEP medicine to prevent HIV.  Get tested for HIV at least once in your life, whether you are at risk for HIV or not.  Cancer screening tests  Cervical cancer screening: If you have a cervix, begin getting regular cervical cancer screening tests starting at age 21.  Breast cancer scan (mammogram): If you've ever had breasts, begin having regular mammograms starting at age 40. This is a scan to check for breast cancer.  Colon cancer screening: It is important to start screening for colon cancer at age 45.  Have a colonoscopy test every 10 years (or more often if you're at risk) Or, ask your provider about stool tests like a FIT test every year or Cologuard test every 3 years.  To learn more about your testing options, visit:   .  For help making a decision, visit:   https://bit.ly/nn45492.  Prostate cancer screening test: If you have a prostate, ask your care team if a prostate cancer screening test (PSA) at age 55 is right for you.  Lung cancer screening: If you are a current or former smoker ages 50 to 80, ask your care team if ongoing lung cancer screenings are right for you.  For informational purposes only. Not to replace the advice of your health care provider. Copyright   2023 [employer]. All rights reserved. Clinically reviewed by the [facility]. Mumum[employer]o [id number]  - REV 01/24.  Learning About Stress  What is stress?     Stress is your body's response to a hard situation. Your body can have a physical, emotional, or mental response. Stress is a fact of life for most people, and it affects everyone differently. What causes stress for you may not be stressful for someone else.  A lot of things can cause stress. You may feel stress when you go on a job interview, take a test, or run a race. This kind of short-term stress is normal and even useful. It can help you if you need to work hard or react quickly. For example, stress can help you finish an important job on time.  Long-term stress is caused by ongoing stressful situations or events. Examples of long-term stress include long-term health problems, ongoing problems at work, or conflicts in your family. Long-term stress can harm your health.  How does stress affect your health?  When you are stressed, your body responds as though you are in danger. It makes hormones that speed up your heart, make you breathe faster, and give you a burst of energy. This is called the fight-or-flight stress response. If the stress is over quickly, your body goes back to normal and no harm is done.  But if stress happens too often or lasts too long, it can have bad effects. Long-term stress can make you more likely to get sick, and it can make symptoms of some diseases worse. If you tense up when you are stressed, you may develop neck, shoulder, or low back pain. Stress is linked to high blood pressure and heart disease.  Stress also harms your emotional health. It can make you gordon, tense, or depressed. Your relationships may suffer, and you may not do well at work or school.  What can you do to manage stress?  You can try these things to help manage stress:   Do something active. Exercise or activity can help reduce stress. Walking is a great way to get started. Even everyday activities such as housecleaning or yard work can help.  Try yoga or  karel chi. These techniques combine exercise and meditation. You may need some training at first to learn them.  Do something you enjoy. For example, listen to music or go to a movie. Practice your hobby or do volunteer work.  Meditate. This can help you relax, because you are not worrying about what happened before or what may happen in the future.  Do guided imagery. Imagine yourself in any setting that helps you feel calm. You can use online videos, books, or a teacher to guide you.  Do breathing exercises. For example:  From a standing position, bend forward from the waist with your knees slightly bent. Let your arms dangle close to the floor.  Breathe in slowly and deeply as you return to a standing position. Roll up slowly and lift your head last.  Hold your breath for just a few seconds in the standing position.  Breathe out slowly and bend forward from the waist.  Let your feelings out. Talk, laugh, cry, and express anger when you need to. Talking with supportive friends or family, a counselor, or a jonelle leader about your feelings is a healthy way to relieve stress. Avoid discussing your feelings with people who make you feel worse.  Write. It may help to write about things that are bothering you. This helps you find out how much stress you feel and what is causing it. When you know this, you can find better ways to cope.  What can you do to prevent stress?  You might try some of these things to help prevent stress:  Manage your time. This helps you find time to do the things you want and need to do.  Get enough sleep. Your body recovers from the stresses of the day while you are sleeping.  Get support. Your family, friends, and community can make a difference in how you experience stress.  Limit your news feed. Avoid or limit time on social media or news that may make you feel stressed.  Do something active. Exercise or activity can help reduce stress. Walking is a great way to get started.  Where can you  "learn more?  Go to https://www.[employer].net/patiented  Enter N032 in the search box to learn more about \"Learning About Stress.\"  Current as of: October 24, 2023  Content Version: 14.3    2024 [employer].   Care instructions adapted under license by your healthcare professional. If you have questions about a medical condition or this instruction, always ask your healthcare professional. [employer] disclaims any warranty or liability for your use of this information.       "

## 2025-03-11 LAB
CHOLEST SERPL-MCNC: 165 MG/DL
CK SERPL-CCNC: 64 U/L (ref 39–308)
FASTING STATUS PATIENT QL REPORTED: YES
HDLC SERPL-MCNC: 57 MG/DL
LDLC SERPL CALC-MCNC: 91 MG/DL
NONHDLC SERPL-MCNC: 108 MG/DL
TRIGL SERPL-MCNC: 84 MG/DL

## 2025-03-12 NOTE — RESULT ENCOUNTER NOTE
Cholesterol and muscle enzyme tests are fine.    Normal hemoglobin a1c means no diabetes or prediabetes.    Fernando Winkler MD

## 2025-03-13 LAB
ALBUMIN SERPL BCG-MCNC: 4.5 G/DL (ref 3.5–5.2)
ALP SERPL-CCNC: 95 U/L (ref 40–150)
ALT SERPL W P-5'-P-CCNC: 23 U/L (ref 0–70)
ANION GAP SERPL CALCULATED.3IONS-SCNC: 12 MMOL/L (ref 7–15)
AST SERPL W P-5'-P-CCNC: 25 U/L (ref 0–45)
BILIRUB SERPL-MCNC: 0.6 MG/DL
BUN SERPL-MCNC: 11.8 MG/DL (ref 6–20)
CALCIUM SERPL-MCNC: 10 MG/DL (ref 8.8–10.4)
CHLORIDE SERPL-SCNC: 103 MMOL/L (ref 98–107)
CREAT SERPL-MCNC: 0.85 MG/DL (ref 0.67–1.17)
EGFRCR SERPLBLD CKD-EPI 2021: >90 ML/MIN/1.73M2
FASTING STATUS PATIENT QL REPORTED: YES
GLUCOSE SERPL-MCNC: 88 MG/DL (ref 70–99)
HCO3 SERPL-SCNC: 26 MMOL/L (ref 22–29)
POTASSIUM SERPL-SCNC: 4.7 MMOL/L (ref 3.4–5.3)
PROT SERPL-MCNC: 7.3 G/DL (ref 6.4–8.3)
SODIUM SERPL-SCNC: 141 MMOL/L (ref 135–145)

## 2025-03-13 NOTE — RESULT ENCOUNTER NOTE
One more set of results.    Electrolytes, kidney, and liver tests all normal.    Fernando Winkler MD

## (undated) DEVICE — PAD CHUX UNDERPAD 23X24" 7136

## (undated) DEVICE — KIT ENDO FIRST STEP DISINFECTANT 200ML W/POUCH EP-4

## (undated) DEVICE — PREP CHLORAPREP 26ML TINTED ORANGE  260815

## (undated) RX ORDER — FENTANYL CITRATE 50 UG/ML
INJECTION, SOLUTION INTRAMUSCULAR; INTRAVENOUS
Status: DISPENSED
Start: 2023-11-06